# Patient Record
Sex: MALE | NOT HISPANIC OR LATINO | URBAN - METROPOLITAN AREA
[De-identification: names, ages, dates, MRNs, and addresses within clinical notes are randomized per-mention and may not be internally consistent; named-entity substitution may affect disease eponyms.]

---

## 2017-09-22 ENCOUNTER — IMPORTED ENCOUNTER (OUTPATIENT)
Dept: URBAN - METROPOLITAN AREA CLINIC 1 | Facility: CLINIC | Age: 57
End: 2017-09-22

## 2017-09-22 PROBLEM — H25.813: Noted: 2017-09-22

## 2017-09-22 PROBLEM — H04.123: Noted: 2017-09-22

## 2017-09-22 PROBLEM — H43.811: Noted: 2017-09-22

## 2017-09-22 PROCEDURE — 92015 DETERMINE REFRACTIVE STATE: CPT

## 2017-09-22 PROCEDURE — 92014 COMPRE OPH EXAM EST PT 1/>: CPT

## 2017-09-22 NOTE — PATIENT DISCUSSION
1.  Cataract OU: Observe for now without intervention. The patient was advised to contact us if any change or worsening of vision2. Dry Eyes OU -- Recommended to patient to use Artificial Tears BID OU3. PVD w/o Tear OD - Patient was cautioned to call our office immediately if they experience   a substantial change in their symptoms such as an increase in floaters persistent flashes loss of visual field (may appear as a shadow or a curtain) or decrease in visual acuity as these may indicate a retinal tear or detachment. 4.  Return for an appointment in 1 year for 30. with Dr. Mary Beth Gutierrez.

## 2018-09-21 ENCOUNTER — IMPORTED ENCOUNTER (OUTPATIENT)
Dept: URBAN - METROPOLITAN AREA CLINIC 1 | Facility: CLINIC | Age: 58
End: 2018-09-21

## 2018-09-21 PROBLEM — H25.13: Noted: 2018-09-21

## 2018-09-21 PROBLEM — H16.143: Noted: 2018-09-21

## 2018-09-21 PROBLEM — H43.811: Noted: 2018-09-21

## 2018-09-21 PROBLEM — H04.123: Noted: 2018-09-21

## 2018-09-21 PROCEDURE — 92014 COMPRE OPH EXAM EST PT 1/>: CPT

## 2018-09-21 PROCEDURE — 92015 DETERMINE REFRACTIVE STATE: CPT

## 2018-09-21 NOTE — PATIENT DISCUSSION
1.  Cataract OU: Observe for now without intervention. The patient was advised to contact us if any change or worsening of vision2. MARYCHUY w/ PEK OU- Recommend ATs TID OU routinely 3. PVD w/o Tear OD - RD precautions. MRX for glasses givenReturn for an appointment in 1 year 27 with Dr. Shayan Monet.

## 2019-09-20 ENCOUNTER — IMPORTED ENCOUNTER (OUTPATIENT)
Dept: URBAN - METROPOLITAN AREA CLINIC 1 | Facility: CLINIC | Age: 59
End: 2019-09-20

## 2019-09-20 PROBLEM — H16.143: Noted: 2019-09-20

## 2019-09-20 PROBLEM — H43.811: Noted: 2019-09-20

## 2019-09-20 PROBLEM — H25.812: Noted: 2019-09-20

## 2019-09-20 PROBLEM — H25.13: Noted: 2019-09-20

## 2019-09-20 PROBLEM — H04.123: Noted: 2019-09-20

## 2019-09-20 PROBLEM — H25.11: Noted: 2019-09-20

## 2019-09-20 PROCEDURE — 92014 COMPRE OPH EXAM EST PT 1/>: CPT

## 2019-09-20 PROCEDURE — 92015 DETERMINE REFRACTIVE STATE: CPT

## 2020-09-25 ENCOUNTER — IMPORTED ENCOUNTER (OUTPATIENT)
Dept: URBAN - METROPOLITAN AREA CLINIC 1 | Facility: CLINIC | Age: 60
End: 2020-09-25

## 2020-09-25 PROBLEM — H04.123: Noted: 2020-09-25

## 2020-09-25 PROBLEM — H43.811: Noted: 2020-09-25

## 2020-09-25 PROBLEM — H25.11: Noted: 2020-09-25

## 2020-09-25 PROBLEM — H16.143: Noted: 2020-09-25

## 2020-09-25 PROBLEM — D22.12: Noted: 2020-09-25

## 2020-09-25 PROBLEM — H25.812: Noted: 2020-09-25

## 2020-09-25 PROCEDURE — 92014 COMPRE OPH EXAM EST PT 1/>: CPT

## 2020-09-25 PROCEDURE — 92015 DETERMINE REFRACTIVE STATE: CPT

## 2021-09-27 ENCOUNTER — IMPORTED ENCOUNTER (OUTPATIENT)
Dept: URBAN - METROPOLITAN AREA CLINIC 1 | Facility: CLINIC | Age: 61
End: 2021-09-27

## 2021-09-27 PROBLEM — H43.811: Noted: 2021-09-27

## 2021-09-27 PROBLEM — H16.143: Noted: 2021-09-27

## 2021-09-27 PROBLEM — H04.123: Noted: 2021-09-27

## 2021-09-27 PROBLEM — H43.813: Noted: 2021-09-27

## 2021-09-27 PROBLEM — H25.813: Noted: 2021-09-27

## 2021-09-27 PROCEDURE — 92015 DETERMINE REFRACTIVE STATE: CPT

## 2021-09-27 PROCEDURE — 99214 OFFICE O/P EST MOD 30 MIN: CPT

## 2021-09-27 NOTE — PATIENT DISCUSSION
1.  Cataract OU -- Non-surgical. Observe for now without intervention. The patient was advised to contact us if any change or worsening of vision2. MARYCHUY w/ PEK OU -- Recommend continue the frequent use of OTC AT's TID-QID OU Routinely. 3. PVD w/o Tear OD -- Old Stable. RD precautions. 4.  Papilloma LLL (benign) -- The patient has a lesion of the left lower eyelid which appears benign. Measurements were taken and observation at regular intervals was recommended. The patient was asked to report if there is any growth. PMG saw previously. $200 OOP to remove. MRX for glasses given. Return for an appointment in 1 year 27 with Dr. Mary Beth Gutierrez.

## 2022-04-02 ASSESSMENT — VISUAL ACUITY
OS_GLARE: 20/60
OD_CC: J1
OS_SC: 20/20
OD_SC: 20/20
OD_SC: 20/20
OS_SC: 20/25
OD_SC: 20/20
OS_GLARE: 20/40
OS_CC: J1
OD_CC: J1
OD_GLARE: 20/60
OS_GLARE: 20/60
OD_SC: 20/20
OS_SC: 20/20-1
OS_CC: J1
OD_SC: 20/20
OD_CC: J1
OS_SC: 20/20
OD_GLARE: 20/60
OS_SC: 20/25
OD_GLARE: 20/40
OD_CC: J1

## 2022-04-02 ASSESSMENT — TONOMETRY
OD_IOP_MMHG: 16
OD_IOP_MMHG: 14
OD_IOP_MMHG: 14
OS_IOP_MMHG: 14
OS_IOP_MMHG: 14
OD_IOP_MMHG: 13
OD_IOP_MMHG: 14
OS_IOP_MMHG: 16
OS_IOP_MMHG: 14
OS_IOP_MMHG: 14

## 2022-05-12 NOTE — PATIENT DISCUSSION
1.  Nuclear Cataract OD / Combined Cataract OS -- Observe for now without intervention. The patient was advised to contact us if any change or worsening of vision. 2. MARYCHUY w/ PEK OU -- Recommend continue the frequent use of OTC AT's TID-QID OU Routinely. (Sample of Blink given)3. PVD w/o Tear OD -- Old Stable. RD precautions given. 4.  Lesion lower lid benign OS - The patient has a lesion of the left lower eyelid which appears benign. Measurements were taken and observation at regular intervals was recommended. The patient was asked to report if there is any growth. PMG saw today. $200 OOP to remove. Finalized Glasses MRX was given to patient today. Return for an appointment in 1 YR for a 30 OU with Dr. Valerie Eldridge.
Cataract OD: Observe for now without intervention.  The patient was advised to contact us if any change or worsening of vision
Lesion lower lid benign OS - The patient has a lesion of the left lower eyelid which appears benign. Measurements were taken and observation at regular intervals was recommended. The patient was asked to report  if there is any growth.
MARYCHUY w/ PEK OU-The use/continuation of artificial tears were recommended.
PVD w/o Tear OD - RD precautions.
no

## 2022-09-27 ENCOUNTER — COMPREHENSIVE EXAM (OUTPATIENT)
Dept: URBAN - METROPOLITAN AREA CLINIC 1 | Facility: CLINIC | Age: 62
End: 2022-09-27

## 2022-09-27 DIAGNOSIS — H04.123: ICD-10-CM

## 2022-09-27 DIAGNOSIS — H43.811: ICD-10-CM

## 2022-09-27 DIAGNOSIS — H16.143: ICD-10-CM

## 2022-09-27 DIAGNOSIS — H25.813: ICD-10-CM

## 2022-09-27 PROCEDURE — 92014 COMPRE OPH EXAM EST PT 1/>: CPT

## 2022-09-27 PROCEDURE — 92015 DETERMINE REFRACTIVE STATE: CPT

## 2022-09-27 ASSESSMENT — TONOMETRY
OD_IOP_MMHG: 16
OS_IOP_MMHG: 16

## 2022-09-27 ASSESSMENT — VISUAL ACUITY
OS_CC: 20/20
OD_CC: 20/20
OS_BAT: 20/60
OD_CC: J1+
OS_CC: J1+
OD_BAT: 20/60

## 2023-09-29 ENCOUNTER — COMPREHENSIVE EXAM (OUTPATIENT)
Dept: URBAN - METROPOLITAN AREA CLINIC 1 | Facility: CLINIC | Age: 63
End: 2023-09-29

## 2023-09-29 DIAGNOSIS — H43.813: ICD-10-CM

## 2023-09-29 DIAGNOSIS — H16.143: ICD-10-CM

## 2023-09-29 DIAGNOSIS — H25.813: ICD-10-CM

## 2023-09-29 DIAGNOSIS — H04.123: ICD-10-CM

## 2023-09-29 PROCEDURE — 92014 COMPRE OPH EXAM EST PT 1/>: CPT

## 2023-09-29 PROCEDURE — 92015 DETERMINE REFRACTIVE STATE: CPT

## 2023-09-29 ASSESSMENT — TONOMETRY
OD_IOP_MMHG: 16
OS_IOP_MMHG: 17

## 2023-09-29 ASSESSMENT — VISUAL ACUITY
OU_CC: J1+
OD_CC: 20/20
OS_CC: 20/20

## 2024-09-19 ENCOUNTER — COMPREHENSIVE EXAM (OUTPATIENT)
Dept: URBAN - METROPOLITAN AREA CLINIC 1 | Facility: CLINIC | Age: 64
End: 2024-09-19

## 2024-09-19 DIAGNOSIS — H16.143: ICD-10-CM

## 2024-09-19 DIAGNOSIS — H04.123: ICD-10-CM

## 2024-09-19 DIAGNOSIS — H25.813: ICD-10-CM

## 2024-09-19 DIAGNOSIS — H43.813: ICD-10-CM

## 2024-09-19 PROCEDURE — 92015 DETERMINE REFRACTIVE STATE: CPT

## 2024-09-19 PROCEDURE — 99214 OFFICE O/P EST MOD 30 MIN: CPT
